# Patient Record
Sex: FEMALE | Race: ASIAN
[De-identification: names, ages, dates, MRNs, and addresses within clinical notes are randomized per-mention and may not be internally consistent; named-entity substitution may affect disease eponyms.]

---

## 2022-06-26 ENCOUNTER — HOSPITAL ENCOUNTER (EMERGENCY)
Dept: HOSPITAL 4 - SED | Age: 52
Discharge: HOME | End: 2022-06-26
Payer: COMMERCIAL

## 2022-06-26 VITALS — HEIGHT: 60 IN | WEIGHT: 119 LBS | BODY MASS INDEX: 23.36 KG/M2

## 2022-06-26 VITALS — SYSTOLIC BLOOD PRESSURE: 160 MMHG

## 2022-06-26 VITALS — SYSTOLIC BLOOD PRESSURE: 171 MMHG

## 2022-06-26 DIAGNOSIS — Z88.2: ICD-10-CM
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
## 2022-06-26 DIAGNOSIS — S53.114A: Primary | ICD-10-CM
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
## 2022-06-26 PROCEDURE — 99284 EMERGENCY DEPT VISIT MOD MDM: CPT

## 2022-06-26 PROCEDURE — 24600 TX CLSD ELBOW DISLC W/O ANES: CPT

## 2022-06-26 PROCEDURE — 96375 TX/PRO/DX INJ NEW DRUG ADDON: CPT

## 2022-06-26 PROCEDURE — 96374 THER/PROPH/DIAG INJ IV PUSH: CPT

## 2022-06-26 PROCEDURE — 73070 X-RAY EXAM OF ELBOW: CPT

## 2022-06-26 NOTE — NUR
Pt resting in bed with eyes closed. O2 97% on RA, even fall and rise of chest, 
and no signs of resp distress. Safety precautions in place.

## 2022-06-26 NOTE — NUR
[facility].PT DENIES 
HEAD,NECK OR BACK INJURY.NO SYNCOPE. PT DENIES SIGNIFICANT MED HX.

## 2022-06-26 NOTE — NUR
Patient given written and verbal discharge instructions and verbalizes 
understanding.  ER Dr. Bernard discussed with patient the results and treatment 
provided. Patient in stable condition. ID arm band removed. IV catheter removed 
intact and dressing applied, no active bleeding.

Rx of NORCO AND IBUPROFEN given. Patient educated on pain management and to 
follow up with PMD. Pain Scale 4.

Opportunity for questions provided and answered. Medication side effect fact 
sheet provided. 

Pt educated on when to return if splint is too tight. Pt taught to return if 
numbness, tingling, or swelling occurs.